# Patient Record
Sex: MALE | Race: ASIAN
[De-identification: names, ages, dates, MRNs, and addresses within clinical notes are randomized per-mention and may not be internally consistent; named-entity substitution may affect disease eponyms.]

---

## 2022-01-18 ENCOUNTER — APPOINTMENT (OUTPATIENT)
Dept: NEUROLOGY | Facility: CLINIC | Age: 67
End: 2022-01-18
Payer: MEDICAID

## 2022-01-18 VITALS
HEART RATE: 79 BPM | DIASTOLIC BLOOD PRESSURE: 82 MMHG | WEIGHT: 213 LBS | HEIGHT: 65 IN | SYSTOLIC BLOOD PRESSURE: 150 MMHG | BODY MASS INDEX: 35.49 KG/M2

## 2022-01-18 DIAGNOSIS — M25.511 PAIN IN RIGHT SHOULDER: ICD-10-CM

## 2022-01-18 DIAGNOSIS — H25.89 OTHER AGE-RELATED CATARACT: ICD-10-CM

## 2022-01-18 DIAGNOSIS — Z87.39 PERSONAL HISTORY OF OTHER DISEASES OF THE MUSCULOSKELETAL SYSTEM AND CONNECTIVE TISSUE: ICD-10-CM

## 2022-01-18 DIAGNOSIS — Z83.3 FAMILY HISTORY OF DIABETES MELLITUS: ICD-10-CM

## 2022-01-18 DIAGNOSIS — G89.29 PAIN IN LEFT SHOULDER: ICD-10-CM

## 2022-01-18 DIAGNOSIS — Z86.79 PERSONAL HISTORY OF OTHER DISEASES OF THE CIRCULATORY SYSTEM: ICD-10-CM

## 2022-01-18 DIAGNOSIS — M25.512 PAIN IN LEFT SHOULDER: ICD-10-CM

## 2022-01-18 DIAGNOSIS — G89.29 PAIN IN RIGHT SHOULDER: ICD-10-CM

## 2022-01-18 DIAGNOSIS — Z87.438 PERSONAL HISTORY OF OTHER DISEASES OF MALE GENITAL ORGANS: ICD-10-CM

## 2022-01-18 DIAGNOSIS — G62.9 POLYNEUROPATHY, UNSPECIFIED: ICD-10-CM

## 2022-01-18 DIAGNOSIS — Z82.49 FAMILY HISTORY OF ISCHEMIC HEART DISEASE AND OTHER DISEASES OF THE CIRCULATORY SYSTEM: ICD-10-CM

## 2022-01-18 DIAGNOSIS — E11.49 TYPE 2 DIABETES MELLITUS WITH OTHER DIABETIC NEUROLOGICAL COMPLICATION: ICD-10-CM

## 2022-01-18 PROCEDURE — 99213 OFFICE O/P EST LOW 20 MIN: CPT

## 2022-01-19 PROBLEM — Z87.438 HISTORY OF PROSTATE DISORDER: Status: RESOLVED | Noted: 2022-01-19 | Resolved: 2022-01-19

## 2022-01-19 PROBLEM — Z86.79 HISTORY OF CARDIAC DISORDER: Status: RESOLVED | Noted: 2022-01-19 | Resolved: 2022-01-19

## 2022-01-19 PROBLEM — M25.512 CHRONIC LEFT SHOULDER PAIN: Status: RESOLVED | Noted: 2022-01-19 | Resolved: 2022-01-19

## 2022-01-19 PROBLEM — H25.89 OTHER AGE-RELATED CATARACT OF LEFT EYE: Status: RESOLVED | Noted: 2022-01-19 | Resolved: 2022-01-19

## 2022-01-19 PROBLEM — E11.49 TYPE 2 DIABETES MELLITUS WITH OTHER NEUROLOGIC COMPLICATION: Status: RESOLVED | Noted: 2022-01-19 | Resolved: 2022-01-19

## 2022-01-19 PROBLEM — M25.511 CHRONIC RIGHT SHOULDER PAIN: Status: RESOLVED | Noted: 2022-01-19 | Resolved: 2022-01-19

## 2022-01-19 PROBLEM — Z83.3 FAMILY HISTORY OF DIABETES MELLITUS: Status: ACTIVE | Noted: 2022-01-19

## 2022-01-19 PROBLEM — Z82.49 FAMILY HISTORY OF CARDIAC DISORDER: Status: ACTIVE | Noted: 2022-01-19

## 2022-01-19 PROBLEM — Z87.39 HISTORY OF ARTHRITIS: Status: RESOLVED | Noted: 2022-01-19 | Resolved: 2022-01-19

## 2022-01-19 PROBLEM — G62.9 PERIPHERAL NEUROPATHY: Status: ACTIVE | Noted: 2022-01-19

## 2022-01-19 PROBLEM — Z87.438 HISTORY OF ERECTILE DYSFUNCTION: Status: RESOLVED | Noted: 2022-01-19 | Resolved: 2022-01-19

## 2022-01-19 PROBLEM — Z86.79 HISTORY OF HYPERTENSION: Status: RESOLVED | Noted: 2022-01-19 | Resolved: 2022-01-19

## 2022-01-19 RX ORDER — AMOXICILLIN AND CLAVULANATE POTASSIUM 875; 125 MG/1; MG/1
875-125 TABLET, COATED ORAL
Qty: 6 | Refills: 0 | Status: ACTIVE | COMMUNITY
Start: 2021-11-15

## 2022-01-19 RX ORDER — ASPIRIN ENTERIC COATED TABLETS 81 MG 81 MG/1
81 TABLET, DELAYED RELEASE ORAL
Qty: 90 | Refills: 0 | Status: ACTIVE | COMMUNITY
Start: 2021-11-09

## 2022-01-19 RX ORDER — ATORVASTATIN CALCIUM 40 MG/1
40 TABLET, FILM COATED ORAL
Qty: 90 | Refills: 0 | Status: ACTIVE | COMMUNITY
Start: 2021-11-09

## 2022-01-19 RX ORDER — ISOSORBIDE MONONITRATE 30 MG/1
30 TABLET, EXTENDED RELEASE ORAL
Qty: 90 | Refills: 0 | Status: ACTIVE | COMMUNITY
Start: 2021-11-18

## 2022-01-19 RX ORDER — GLIPIZIDE 5 MG/1
5 TABLET ORAL
Qty: 60 | Refills: 0 | Status: ACTIVE | COMMUNITY
Start: 2021-11-09

## 2022-01-19 RX ORDER — METOPROLOL SUCCINATE 25 MG/1
25 TABLET, EXTENDED RELEASE ORAL
Qty: 90 | Refills: 0 | Status: ACTIVE | COMMUNITY
Start: 2021-11-09

## 2022-01-19 RX ORDER — METFORMIN HYDROCHLORIDE 1000 MG/1
1000 TABLET, COATED ORAL
Qty: 180 | Refills: 0 | Status: ACTIVE | COMMUNITY
Start: 2021-11-12

## 2022-01-19 RX ORDER — BACLOFEN 10 MG/1
10 TABLET ORAL
Qty: 20 | Refills: 0 | Status: ACTIVE | COMMUNITY
Start: 2021-11-09

## 2022-01-19 RX ORDER — LINACLOTIDE 145 UG/1
145 CAPSULE, GELATIN COATED ORAL
Qty: 90 | Refills: 0 | Status: ACTIVE | COMMUNITY
Start: 2021-11-09

## 2022-01-19 RX ORDER — HYDROXYZINE HYDROCHLORIDE 25 MG/1
25 TABLET ORAL
Qty: 30 | Refills: 0 | Status: ACTIVE | COMMUNITY
Start: 2021-11-30

## 2022-01-19 RX ORDER — ACETAMINOPHEN EXTRA STRENGTH 500 MG/1
500 TABLET ORAL
Qty: 21 | Refills: 0 | Status: ACTIVE | COMMUNITY
Start: 2021-11-15

## 2022-01-19 RX ORDER — LISINOPRIL AND HYDROCHLOROTHIAZIDE TABLETS 20; 25 MG/1; MG/1
20-25 TABLET ORAL
Qty: 90 | Refills: 0 | Status: ACTIVE | COMMUNITY
Start: 2021-11-09

## 2022-01-19 RX ORDER — HYDROCORTISONE 1 %
12 CREAM (GRAM) TOPICAL
Qty: 400 | Refills: 0 | Status: ACTIVE | COMMUNITY
Start: 2021-12-06

## 2022-01-19 NOTE — PHYSICAL EXAM
[FreeTextEntry1] : Vital signs are recorded and unremarkable.  Head neck, ear nose and throat is unremarkable.  There is no carotid bruit, thyromegaly or lymphadenopathy.  Chest is clear and heart sounds are normal.  Abdomen is soft and there is no tenderness.  Pedal pulsations are normal and there is no leg edema.\par \par There are no meningeal signs.  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  Rest of his examination is.  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 [General Appearance - Alert] : alert [General Appearance - In No Acute Distress] : in no acute distress [Oriented To Time, Place, And Person] : oriented to person, place, and time [Impaired Insight] : insight and judgment were intact [Affect] : the affect was normal [Person] : oriented to person [Place] : oriented to place [Time] : oriented to time [Concentration Intact] : normal concentrating ability [Visual Intact] : visual attention was ~T not ~L decreased [Naming Objects] : no difficulty naming common objects [Repeating Phrases] : no difficulty repeating a phrase [Writing A Sentence] : no difficulty writing a sentence [Fluency] : fluency intact [Comprehension] : comprehension intact [Reading] : reading intact [Past History] : adequate knowledge of personal past history [Cranial Nerves Optic (II)] : visual acuity intact bilaterally,  visual fields full to confrontation, pupils equal round and reactive to light [Cranial Nerves Oculomotor (III)] : extraocular motion intact [Cranial Nerves Trigeminal (V)] : facial sensation intact symmetrically [Cranial Nerves Facial (VII)] : face symmetrical [Cranial Nerves Vestibulocochlear (VIII)] : hearing was intact bilaterally [Cranial Nerves Glossopharyngeal (IX)] : tongue and palate midline [Cranial Nerves Accessory (XI - Cranial And Spinal)] : head turning and shoulder shrug symmetric [Cranial Nerves Hypoglossal (XII)] : there was no tongue deviation with protrusion [Motor Tone] : muscle tone was normal in all four extremities [Motor Strength] : muscle strength was normal in all four extremities [No Muscle Atrophy] : normal bulk in all four extremities [Sensation Tactile Decrease] : light touch was intact [Abnormal Walk] : normal gait [Balance] : balance was intact [Past-pointing] : there was no past-pointing [Tremor] : no tremor present [2+] : Patella left 2+ [0] : Ankle jerk left 0 [Plantar Reflex Right Only] : normal on the right [Plantar Reflex Left Only] : normal on the left [FreeTextEntry7] : 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

## 2022-01-19 NOTE — HISTORY OF PRESENT ILLNESS
[FreeTextEntry1] : The patient is a 66-year-old  male from [facility] of [place] descent referred by Dr. Cardenas and has no medical records in the electronic system but I received a referral without any clinical details.\par \par The patient is stated that he has burning sensation in the feet for the last 5 years which is painful and graded it as 8-9/10 worse throughout the day mostly in the afternoon hours and evening related to activities but there is no nocturnal paresthesias or restless legs and was insidious in onset and has been diabetic for over 15 years without obvious history of diabetic retinopathy or nephropathy.  He also complained of right thumb joint pain since last 6 months diffuse weakness without bowel or bladder dysfunction but has chronic erectile dysfunction and has bilateral shoulder and neck pain with a stiffness but denied any radicular symptoms into the upper extremity and there is no history of low back pain or radicular symptoms.\par \par Review of system is otherwise unremarkable.\par \par Past medical history is pertinent for diabetes treated with oral antidiabetic drugs with hemoglobin A1c ranging from 7-8.2 has history of cardiac stents, hypertension left cataract surgery prostatic hypertrophy and denied any TIA or stroke history no spinal injuries no history of cancer no liver or kidney disease and no tuberculosis or chronic infections.\par \par Is 66-year-old and has no history of smoking drinks at least 2-3 drinks a day including vodka is  has no children and there is a strong family history of coronary artery disease and diabetes 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  He has never seen a psychiatrist or a neurologist in the past.  I reviewed his medications and allergies.  General examination is unremarkable.

## 2022-01-19 NOTE — REVIEW OF SYSTEMS
[Leg Weakness] : leg weakness [Numbness] : numbness [Tingling] : tingling [As noted in HPI] : as noted in HPI [As Noted in HPI] : as noted in HPI [Negative] : Heme/Lymph

## 2022-01-19 NOTE — DISCUSSION/SUMMARY
[FreeTextEntry1] : Opinion–the patient has history of chronic diabetes for over 15 years without adequately good controlled hemoglobin A1c and his symptoms are typical of distal sensory neuropathy perhaps also involving the small fibers as he has burning paresthesias without any evidence of diabetic retinopathy or nephropathy and was advised detailed electrophysiologic testing after appropriate authorization and if I confirm presence of neuropathy I will further investigate him and treat him with medications for her burning paresthesias after confirmation.  I also advised him that he should see an orthopedic surgeon for chronic shoulder enthesopathy and right thumb pain and remain under the care of his physician Dr. Cardenas for good control of diabetes proper nutrition vitamin intake exercise and remain under the care of his cardiologist as he has history of cardiac disease with his stents.  Extensive education and counseling was provided an appointment has been given.

## 2022-03-07 ENCOUNTER — APPOINTMENT (OUTPATIENT)
Dept: NEUROLOGY | Facility: CLINIC | Age: 67
End: 2022-03-07

## 2022-04-19 ENCOUNTER — APPOINTMENT (OUTPATIENT)
Dept: NEUROLOGY | Facility: CLINIC | Age: 67
End: 2022-04-19
Payer: MEDICAID

## 2022-04-19 PROCEDURE — 95910 NRV CNDJ TEST 7-8 STUDIES: CPT

## 2022-04-19 PROCEDURE — 95886 MUSC TEST DONE W/N TEST COMP: CPT

## 2022-04-19 RX ORDER — GABAPENTIN 100 MG/1
100 CAPSULE ORAL 3 TIMES DAILY
Qty: 180 | Refills: 3 | Status: ACTIVE | COMMUNITY
Start: 2022-04-19 | End: 1900-01-01

## 2022-08-30 ENCOUNTER — APPOINTMENT (OUTPATIENT)
Dept: NEUROLOGY | Facility: CLINIC | Age: 67
End: 2022-08-30

## 2023-05-25 ENCOUNTER — APPOINTMENT (OUTPATIENT)
Dept: ORTHOPEDIC SURGERY | Facility: CLINIC | Age: 68
End: 2023-05-25
Payer: MEDICARE

## 2023-05-25 VITALS — BODY MASS INDEX: 35.49 KG/M2 | HEIGHT: 65 IN | WEIGHT: 213 LBS

## 2023-05-25 PROCEDURE — 73564 X-RAY EXAM KNEE 4 OR MORE: CPT | Mod: 50

## 2023-05-25 PROCEDURE — 99203 OFFICE O/P NEW LOW 30 MIN: CPT | Mod: 25

## 2023-05-25 PROCEDURE — 20611 DRAIN/INJ JOINT/BURSA W/US: CPT | Mod: 50

## 2023-05-25 NOTE — HISTORY OF PRESENT ILLNESS
[9] : 9 [8] : 8 [Tightness] : tightness [Tingling] : tingling [Constant] : constant [Leisure] : leisure [Sleep] : sleep [Heat] : heat [Sitting] : sitting [Standing] : standing [Walking] : walking [Exercising] : exercising [Stairs] : stairs [Lying in bed] : lying in bed [de-identified] : 5/25/23: 67M here with complaints of bilateral knee pain, L>R for the past year. He denies recent injury or trauma. He denies prior surgeries on the knees. He has swelling to the knees. He had cortisone shot to both knees in October 2022. 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 Pt started going for walks and feels like this has aggravated both knees. B/L Knees cracking and popping. Pt notes swelling in both knees. ROM normal. Pt treated prior with Ortho MD and had CSI and had temporrary relief. Pt would like to discuss getting Gel Inj.  [FreeTextEntry7] : down to the feet [FreeTextEntry9] : Icy Hot Spray [de-identified] : Danielle Berry MD [de-identified] : XR

## 2023-05-25 NOTE — IMAGING
[Bilateral] : knee bilaterally [AP] : anteroposterior [Lateral] : lateral [Capron] : skyline [AP Standing] : anteroposterior standing [advanced tricompartmental OA with medial compartment narrowing and varus alignment] : advanced tricompartmental OA with medial compartment narrowing and varus alignment

## 2023-05-25 NOTE — DISCUSSION/SUMMARY
[de-identified] : 67M here with severe bilateral knee djd-tolerated csi well to both knees\par 1) auth for visco\par 2) cryo, nsaids, activity modification\par 3) fu when injections approved

## 2023-05-25 NOTE — PHYSICAL EXAM
[Bilateral] : knee bilaterally [5___] : hamstring 5[unfilled]/5 [] : negative Lachmann [TWNoteComboBox7] : flexion 125 degrees

## 2023-05-31 ENCOUNTER — FORM ENCOUNTER (OUTPATIENT)
Age: 68
End: 2023-05-31

## 2023-06-01 ENCOUNTER — FORM ENCOUNTER (OUTPATIENT)
Age: 68
End: 2023-06-01

## 2023-06-02 RX ORDER — HYALURONATE SODIUM 20 MG/2 ML
20 SYRINGE (ML) INTRAARTICULAR
Qty: 6 | Refills: 0 | Status: ACTIVE | COMMUNITY
Start: 2023-06-02 | End: 1900-01-01

## 2023-07-13 ENCOUNTER — APPOINTMENT (OUTPATIENT)
Dept: ORTHOPEDIC SURGERY | Facility: CLINIC | Age: 68
End: 2023-07-13
Payer: MEDICARE

## 2023-07-13 VITALS — HEIGHT: 65 IN | BODY MASS INDEX: 35.49 KG/M2 | WEIGHT: 213 LBS

## 2023-07-13 PROCEDURE — 99212 OFFICE O/P EST SF 10 MIN: CPT | Mod: 25

## 2023-07-13 PROCEDURE — 20611 DRAIN/INJ JOINT/BURSA W/US: CPT | Mod: 50

## 2023-07-13 NOTE — DISCUSSION/SUMMARY
[de-identified] : 67m with b/l knee djd. discussed availability of visco injections and pt would like to proceed. \par Euflexxa #1 Injection tolerated well. Post injection instructions reviewed.\par 1) wbat, cryotherapy\par 2) rtc 1 week\par \par Entered by Mary Lopez acting as scribe.\par Dr. Rose-\par The documentation recorded by the scribe accurately reflects the service I personally performed and the decisions made by me. \par

## 2023-07-13 NOTE — HISTORY OF PRESENT ILLNESS
[Tightness] : tightness [Tingling] : tingling [Constant] : constant [Leisure] : leisure [Sleep] : sleep [Heat] : heat [Sitting] : sitting [Standing] : standing [Walking] : walking [Exercising] : exercising [Stairs] : stairs [Lying in bed] : lying in bed [8] : 8 [de-identified] : 7/13/23: Here to f/up b/l knees and consider euflexxa injections\par 5/25/23: 67M here with complaints of bilateral knee pain, L>R for the past year. He denies recent injury or trauma. He denies prior surgeries on the knees. He has swelling to the knees. He had cortisone shot to both knees in October 2022. 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 Pt pain in B/L knees Improving with Inj.  Pt notes swelling in both calf. ROM normal.  [FreeTextEntry7] : down to the feet [FreeTextEntry9] : Icy Hot Spray [de-identified] : Danielle Berry MD [de-identified] : XR

## 2023-07-13 NOTE — PROCEDURE
[FreeTextEntry3] : Large joint injection was performed  of the b/l knees. The indication for this procedure was x-ray evidence of Osteoarthritis on this or prior visit. The site was prepped with alcohol and betadine. An injection of Lidocaine 3cc of 1% , Euflexxa 2ml, # [1] was used. \par \par Patient was advised to call if redness, pain or fever occur and apply ice for 15 minutes out of every hour for the next 12-24 hours as tolerated. \par \par Patient has tried OTC's including aspirin, Ibuprofen, Aleve, etc or prescription NSAIDS, and/or exercises at home and/or physical therapy without satisfactory response, patient had decreased mobility in the joint and the risks benefits, and alternatives have been discussed, and verbal consent was obtained. \par \par The risks, benefits and contents of the injection have been discussed.  Risks include but are not limited to allergic reaction, flare reaction, permanent white skin discoloration at the injection site and infection.  The patient understands the risks and agrees to having the injection.  All questions have been answered.\par \par Ultrasound guidance was indicated for this patient due to prior failure or difficult injection.\par \par

## 2023-07-20 ENCOUNTER — APPOINTMENT (OUTPATIENT)
Dept: ORTHOPEDIC SURGERY | Facility: CLINIC | Age: 68
End: 2023-07-20
Payer: MEDICARE

## 2023-07-20 VITALS — WEIGHT: 213 LBS | HEIGHT: 65 IN | BODY MASS INDEX: 35.49 KG/M2

## 2023-07-20 PROCEDURE — 20611 DRAIN/INJ JOINT/BURSA W/US: CPT | Mod: 50

## 2023-07-20 PROCEDURE — 99212 OFFICE O/P EST SF 10 MIN: CPT | Mod: 25

## 2023-07-20 NOTE — HISTORY OF PRESENT ILLNESS
[8] : 8 [Tightness] : tightness [Tingling] : tingling [Constant] : constant [Leisure] : leisure [Sleep] : sleep [Heat] : heat [Sitting] : sitting [Standing] : standing [Walking] : walking [Exercising] : exercising [Stairs] : stairs [Lying in bed] : lying in bed [Euflexxa] : Euflexxa [de-identified] : 7/20/23: Here to f/up b/l knees and euflexxa #2\par 7/13/23: Here to f/up b/l knees and consider euflexxa injections\par 5/25/23: 67M here with complaints of bilateral knee pain, L>R for the past year. He denies recent injury or trauma. He denies prior surgeries on the knees. He has swelling to the knees. He had cortisone shot to both knees in October 2022. 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 Pt pain in B/L knees Improving with Inj.  Pt notes swelling in both calfs. ROM normal.  [FreeTextEntry7] : down to the feet [FreeTextEntry9] : Icy Hot Spray [de-identified] : Danielle Berry MD [de-identified] : XR

## 2023-07-20 NOTE — DISCUSSION/SUMMARY
[de-identified] : 67m with b/l knee djd. discussed availability of visco injections and pt would like to proceed. \par Euflexxa #2 Injection tolerated well. Post injection instructions reviewed.\par 1) wbat, cryotherapy\par 2) rtc 1 week\par \par Entered by Mary Lopez acting as scribe.\par Dr. Rose-\par The documentation recorded by the scribe accurately reflects the service I personally performed and the decisions made by me. \par

## 2023-07-20 NOTE — PROCEDURE
[FreeTextEntry3] : Large joint injection was performed  of the b/l knees. The indication for this procedure was x-ray evidence of Osteoarthritis on this or prior visit. The site was prepped with alcohol and betadine. An injection of Lidocaine 3cc of 1% , Euflexxa 2ml, # [2] was used. \par \par Patient was advised to call if redness, pain or fever occur and apply ice for 15 minutes out of every hour for the next 12-24 hours as tolerated. \par \par Patient has tried OTC's including aspirin, Ibuprofen, Aleve, etc or prescription NSAIDS, and/or exercises at home and/or physical therapy without satisfactory response, patient had decreased mobility in the joint and the risks benefits, and alternatives have been discussed, and verbal consent was obtained. \par \par The risks, benefits and contents of the injection have been discussed.  Risks include but are not limited to allergic reaction, flare reaction, permanent white skin discoloration at the injection site and infection.  The patient understands the risks and agrees to having the injection.  All questions have been answered.\par \par Ultrasound guidance was indicated for this patient due to prior failure or difficult injection.\par \par

## 2023-07-27 ENCOUNTER — APPOINTMENT (OUTPATIENT)
Dept: ORTHOPEDIC SURGERY | Facility: CLINIC | Age: 68
End: 2023-07-27
Payer: MEDICARE

## 2023-07-27 VITALS — HEIGHT: 65 IN | WEIGHT: 213 LBS | BODY MASS INDEX: 35.49 KG/M2

## 2023-07-27 PROCEDURE — 99213 OFFICE O/P EST LOW 20 MIN: CPT | Mod: 25

## 2023-07-27 PROCEDURE — 20610 DRAIN/INJ JOINT/BURSA W/O US: CPT | Mod: 50

## 2023-07-27 NOTE — HISTORY OF PRESENT ILLNESS
[8] : 8 [Tightness] : tightness [Tingling] : tingling [Constant] : constant [Leisure] : leisure [Sleep] : sleep [Heat] : heat [Sitting] : sitting [Standing] : standing [Walking] : walking [Exercising] : exercising [Stairs] : stairs [Lying in bed] : lying in bed [Euflexxa] : Euflexxa [de-identified] : 7/27/23: Here to f/up b/l knees and euflexxa #3. \par 7/20/23: Here to f/up b/l knees and euflexxa #2\par 7/13/23: Here to f/up b/l knees and consider euflexxa injections\par 5/25/23: 67M here with complaints of bilateral knee pain, L>R for the past year. He denies recent injury or trauma. He denies prior surgeries on the knees. He has swelling to the knees. He had cortisone shot to both knees in October 2022. 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 Pt pain in B/L knees Improving with Inj.  Pt notes swelling in both calfs. ROM normal.  [FreeTextEntry7] : down to the feet [FreeTextEntry9] : Icy Hot Spray [de-identified] : Danielle Berry MD [de-identified] : XR

## 2023-07-27 NOTE — PROCEDURE
[FreeTextEntry3] : Large joint injection was performed  of the b/l knees. The indication for this procedure was x-ray evidence of Osteoarthritis on this or prior visit. The site was prepped with alcohol and betadine. An injection of Lidocaine 3cc of 1% , Euflexxa 2ml, # [3] was used. \par \par Patient was advised to call if redness, pain or fever occur and apply ice for 15 minutes out of every hour for the next 12-24 hours as tolerated. \par \par Patient has tried OTC's including aspirin, Ibuprofen, Aleve, etc or prescription NSAIDS, and/or exercises at home and/or physical therapy without satisfactory response, patient had decreased mobility in the joint and the risks benefits, and alternatives have been discussed, and verbal consent was obtained. \par \par The risks, benefits and contents of the injection have been discussed.  Risks include but are not limited to allergic reaction, flare reaction, permanent white skin discoloration at the injection site and infection.  The patient understands the risks and agrees to having the injection.  All questions have been answered.\par \par Ultrasound guidance was indicated for this patient due to prior failure or difficult injection.\par \par

## 2023-07-27 NOTE — DISCUSSION/SUMMARY
[de-identified] : 67m with b/l knee djd. discussed availability of visco injections and pt would like to proceed. \par Euflexxa #3 Injection tolerated well. Post injection instructions reviewed.\par 1) wbat, cryotherapy\par 2) rtc 6 weeks\par \par \par

## 2023-09-07 ENCOUNTER — APPOINTMENT (OUTPATIENT)
Dept: ORTHOPEDIC SURGERY | Facility: CLINIC | Age: 68
End: 2023-09-07

## 2023-09-22 ENCOUNTER — APPOINTMENT (OUTPATIENT)
Dept: ORTHOPEDIC SURGERY | Facility: CLINIC | Age: 68
End: 2023-09-22
Payer: MEDICARE

## 2023-09-22 VITALS — HEIGHT: 65 IN | BODY MASS INDEX: 35.32 KG/M2 | WEIGHT: 212 LBS

## 2023-09-22 DIAGNOSIS — M17.11 UNILATERAL PRIMARY OSTEOARTHRITIS, RIGHT KNEE: ICD-10-CM

## 2023-09-22 DIAGNOSIS — M21.061 VALGUS DEFORMITY, NOT ELSEWHERE CLASSIFIED, RIGHT KNEE: ICD-10-CM

## 2023-09-22 DIAGNOSIS — Z00.00 ENCOUNTER FOR GENERAL ADULT MEDICAL EXAMINATION W/OUT ABNORMAL FINDINGS: ICD-10-CM

## 2023-09-22 DIAGNOSIS — M17.0 BILATERAL PRIMARY OSTEOARTHRITIS OF KNEE: ICD-10-CM

## 2023-09-22 PROCEDURE — 99213 OFFICE O/P EST LOW 20 MIN: CPT

## 2023-09-22 RX ORDER — METHYLPREDNISOLONE 4 MG/1
4 TABLET ORAL
Qty: 1 | Refills: 0 | Status: ACTIVE | COMMUNITY
Start: 2023-09-22 | End: 1900-01-01

## 2023-09-28 RX ORDER — MELOXICAM 15 MG/1
15 TABLET ORAL
Qty: 30 | Refills: 0 | Status: ACTIVE | COMMUNITY
Start: 2023-09-28 | End: 1900-01-01

## 2023-10-17 ENCOUNTER — APPOINTMENT (OUTPATIENT)
Dept: ORTHOPEDIC SURGERY | Facility: CLINIC | Age: 68
End: 2023-10-17
Payer: MEDICARE

## 2023-10-17 VITALS — HEIGHT: 65 IN | BODY MASS INDEX: 35.32 KG/M2 | WEIGHT: 212 LBS

## 2023-10-17 DIAGNOSIS — M18.12 UNILATERAL PRIMARY OSTEOARTHRITIS OF FIRST CARPOMETACARPAL JOINT, LEFT HAND: ICD-10-CM

## 2023-10-17 DIAGNOSIS — M18.11 UNILATERAL PRIMARY OSTEOARTHRITIS OF FIRST CARPOMETACARPAL JOINT, RIGHT HAND: ICD-10-CM

## 2023-10-17 PROCEDURE — 73130 X-RAY EXAM OF HAND: CPT | Mod: 50

## 2023-10-17 PROCEDURE — 99204 OFFICE O/P NEW MOD 45 MIN: CPT

## 2023-10-17 RX ORDER — DICLOFENAC SODIUM 1% 10 MG/G
1 GEL TOPICAL
Qty: 1 | Refills: 2 | Status: ACTIVE | COMMUNITY
Start: 2023-10-17 | End: 1900-01-01

## 2023-11-16 ENCOUNTER — APPOINTMENT (OUTPATIENT)
Dept: ORTHOPEDIC SURGERY | Facility: CLINIC | Age: 68
End: 2023-11-16